# Patient Record
Sex: FEMALE | Race: BLACK OR AFRICAN AMERICAN | NOT HISPANIC OR LATINO | Employment: PART TIME | URBAN - METROPOLITAN AREA
[De-identification: names, ages, dates, MRNs, and addresses within clinical notes are randomized per-mention and may not be internally consistent; named-entity substitution may affect disease eponyms.]

---

## 2017-09-14 ENCOUNTER — TELEPHONE (OUTPATIENT)
Dept: SURGERY | Facility: CLINIC | Age: 52
End: 2017-09-14

## 2017-09-14 NOTE — TELEPHONE ENCOUNTER
"Leidy Lindo MA; [facility]   Caller: Unspecified (Today,  8:42 AM)             Patient came in for Genetic testing with Summer GARG for 7:30.  The patient demographic shows no healthcare insurance coverage.  I spoke to Summer she stated that the patient appointment will take between 45 min to an hour for the Genetic Consultation.  Once the test is performed, then sample will be sent to the Outside Lab and then they will reach out to the patient in regards to their patient responsibility.  However if the patient cannot afford then the Lab will offer payment option or Financial Assistance.   I then spoke to the patient in regards to the cost and the financial obligation.  Per the patient, she does not have insurance and cannot afford the $500, self-pay deposit.  In addition, she does not want to give her personal financial information to apply for Financial Assistance.  Per the patient, she stated and I quote:   "To be honest I am only taking the test to appease her Aunt and I thought that my Aunt insurance would have paid for her to take the test."   I informed the patient that [facility] would not file the claim to her Aunt insurance carrier for the Genetic Test.     The patient understood and apologies for any inconvenient that she may or may not cause.  I stressed to the patient no apologies was necessary and the ending of the conversation went very well.  The patient requested to cancel the appointment.        "

## 2018-03-15 PROBLEM — Z12.11 COLON CANCER SCREENING: Status: ACTIVE | Noted: 2018-03-15

## 2018-12-11 PROBLEM — Z12.11 COLON CANCER SCREENING: Status: RESOLVED | Noted: 2018-03-15 | Resolved: 2018-12-11

## 2018-12-11 PROBLEM — I10 ESSENTIAL HYPERTENSION: Chronic | Status: ACTIVE | Noted: 2018-12-11

## 2018-12-11 PROBLEM — E55.9 VITAMIN D DEFICIENCY: Chronic | Status: ACTIVE | Noted: 2018-12-11

## 2018-12-11 PROBLEM — Z98.890 S/P COLONOSCOPY WITH POLYPECTOMY: Chronic | Status: ACTIVE | Noted: 2018-12-11

## 2018-12-13 DIAGNOSIS — R10.30 LOWER ABDOMINAL PAIN: Primary | ICD-10-CM

## 2020-01-09 ENCOUNTER — CLINICAL SUPPORT (OUTPATIENT)
Dept: DIABETES | Facility: CLINIC | Age: 55
End: 2020-01-09
Payer: MEDICAID

## 2020-01-09 VITALS — BODY MASS INDEX: 39.73 KG/M2 | HEIGHT: 60 IN | WEIGHT: 202.38 LBS

## 2020-01-09 DIAGNOSIS — I10 ESSENTIAL HYPERTENSION: ICD-10-CM

## 2020-01-09 DIAGNOSIS — E66.9 OBESITY: ICD-10-CM

## 2020-01-09 DIAGNOSIS — K57.90 DIVERTICULOSIS: Primary | ICD-10-CM

## 2020-01-09 DIAGNOSIS — E55.9 VITAMIN D DEFICIENCY: ICD-10-CM

## 2020-01-09 PROCEDURE — 99999 PR PBB SHADOW E&M-EST. PATIENT-LVL III: ICD-10-PCS | Mod: PBBFAC,,, | Performed by: DIETITIAN, REGISTERED

## 2020-01-09 PROCEDURE — 99213 OFFICE O/P EST LOW 20 MIN: CPT | Mod: PBBFAC,PN | Performed by: DIETITIAN, REGISTERED

## 2020-01-09 PROCEDURE — 99999 PR PBB SHADOW E&M-EST. PATIENT-LVL III: CPT | Mod: PBBFAC,,, | Performed by: DIETITIAN, REGISTERED

## 2020-01-09 PROCEDURE — 97802 MEDICAL NUTRITION INDIV IN: CPT | Mod: PBBFAC,PN,59 | Performed by: DIETITIAN, REGISTERED

## 2020-01-09 NOTE — PROGRESS NOTES
Nutrition Education  Author: Kandice Doe RD, CDE  Date: 1/9/2020              Health Maintenance was reviewed today with patient. Discussed with patient importance of routine eye exams, foot exams/foot care, blood work (i.e.: A1c, microalbumin, and lipid), dental visits, yearly flu vaccine, and pneumonia vaccine as indicated by PCP. Patient verbalized understanding.     Health Maintenance Topics with due status: Not Due       Topic Last Completion Date    Colonoscopy 03/15/2018    Lipid Panel 12/12/2018     Health Maintenance Due   Topic Date Due    Hepatitis C Screening  1965    TETANUS VACCINE  08/10/1983       Nutrition  Meal Planning: skipping meals, water  What type of sweetener do you use?: none  What type of beverages do you drink?: milk, water         Exercise   Exercise Type: none  Frequency: Never         Social History  Preferred Learning Method: Face to Face  Primary Support: Self, Family  Educational Level: College Graduate  Occupation: works at [employer]          .Nutrition Problem  Altered GI Function    Related to (etiology):   Diverticulosis    Signs and Symptoms (as evidenced by):   New diagnosis Diverticulosis     Interventions/Recommendations (treatment strategy):  Nutrition education to build basic and essential nutrition related knowledge of diverticulosis and obesity/weight loss    Nutrition Diagnosis Status:   New                        Barriers to Change  Barriers to Change: None  Learning Challenges : None    Readiness to Learn   Readiness to Learn : Acceptance    Cultural Influences  Cultural Influences: No    Diabetes Education Assessment/Progress  Diabetes Disease Process (diabetes disease process and treatment options): Not Applicable  Nutrition (Incorporating nutritional management into one's lifestyle): Comprehends Key Points, Discussion, Instructed, Individual Session, Written Materials Provided, Demonstration  Physical Activity (incorporating physical activity into one's  lifestyle): Comprehends Key Points, Discussion, Instructed, Individual Session, Written Materials Provided  Medications (states correct name, dose, onset, peak, duration, side effects & timing of meds): Not Applicable  Monitoring (monitoring blood glucose/other parameters & using results): Not Applicable  Acute Complications (preventing, detecting, and treating acute complications): Not Applicable  Chronic Complications (preventing, detecting, and treating chronic complications): Not Applicable  Clinical (diabetes, other pertinent medical history, and relevant comorbidities reviewed during visit): Not Applicable  Cognitive (knowledge of self-management skills, functional health literacy): Not Applicable  Psychosocial (emotional response to diabetes): Not Applicable  Diabetes Distress and Support Systems: Not Applicable  Behavioral (readiness for change, lifestyle practices, self-care behaviors): Not Applicable    Goals  Patient has selected/evaluated goals during today's session: Yes, selected  Healthy Eating: Set  Start Date: 01/09/20  Target Date: 07/09/20         Diabetes Care Plan/Intervention  Education Plan/Intervention: Individual Follow-Up DSMT    Diabetes Meal Plan  Restrictions: Low Fat, Low Sodium, Restricted Carbohydrate  Calories: 1200  Carbohydrate Per Meal: 20-30g  Carbohydrate Per Snack : 7-15g  Fat: 35  Protein: 90    Today's Self-Management Care Plan was developed with the patient's input and is based on barriers identified during today's assessment.    The long and short-term goals in the care plan were written with the patient/caregiver's input. The patient has agreed to work toward these goals to improve her overall diabetes control.      The patient received a copy of today's self-management plan and verbalized understanding of the care plan, goals, and all of today's instructions.      The patient was encouraged to communicate with her physician and care team regarding her condition(s) and  treatment.  I provided the patient with my contact information today and encouraged her to contact me via phone or patient portal as needed.     Education Units of Time   Time Spent: 45 min

## 2021-04-16 ENCOUNTER — PATIENT MESSAGE (OUTPATIENT)
Dept: RESEARCH | Facility: HOSPITAL | Age: 56
End: 2021-04-16

## 2021-06-14 ENCOUNTER — OFFICE VISIT (OUTPATIENT)
Dept: OBSTETRICS AND GYNECOLOGY | Facility: CLINIC | Age: 56
End: 2021-06-14
Payer: MEDICAID

## 2021-06-14 VITALS — WEIGHT: 199.5 LBS | BODY MASS INDEX: 38.97 KG/M2 | DIASTOLIC BLOOD PRESSURE: 90 MMHG | SYSTOLIC BLOOD PRESSURE: 132 MMHG

## 2021-06-14 DIAGNOSIS — Z80.3 FAMILY HISTORY OF BREAST CANCER IN FIRST DEGREE RELATIVE: ICD-10-CM

## 2021-06-14 DIAGNOSIS — Z01.419 WELL WOMAN EXAM WITH ROUTINE GYNECOLOGICAL EXAM: Primary | ICD-10-CM

## 2021-06-14 DIAGNOSIS — Z85.3 PERSONAL HISTORY OF BREAST CANCER: ICD-10-CM

## 2021-06-14 DIAGNOSIS — R23.2 HOT FLASHES: ICD-10-CM

## 2021-06-14 PROCEDURE — 99999 PR PBB SHADOW E&M-EST. PATIENT-LVL III: CPT | Mod: PBBFAC,,, | Performed by: STUDENT IN AN ORGANIZED HEALTH CARE EDUCATION/TRAINING PROGRAM

## 2021-06-14 PROCEDURE — 99386 PR PREVENTIVE VISIT,NEW,40-64: ICD-10-PCS | Mod: S$PBB,,, | Performed by: STUDENT IN AN ORGANIZED HEALTH CARE EDUCATION/TRAINING PROGRAM

## 2021-06-14 PROCEDURE — 88175 CYTOPATH C/V AUTO FLUID REDO: CPT | Performed by: STUDENT IN AN ORGANIZED HEALTH CARE EDUCATION/TRAINING PROGRAM

## 2021-06-14 PROCEDURE — 99999 PR PBB SHADOW E&M-EST. PATIENT-LVL III: ICD-10-PCS | Mod: PBBFAC,,, | Performed by: STUDENT IN AN ORGANIZED HEALTH CARE EDUCATION/TRAINING PROGRAM

## 2021-06-14 PROCEDURE — 87624 HPV HI-RISK TYP POOLED RSLT: CPT | Performed by: STUDENT IN AN ORGANIZED HEALTH CARE EDUCATION/TRAINING PROGRAM

## 2021-06-14 PROCEDURE — 99386 PREV VISIT NEW AGE 40-64: CPT | Mod: S$PBB,,, | Performed by: STUDENT IN AN ORGANIZED HEALTH CARE EDUCATION/TRAINING PROGRAM

## 2021-06-14 PROCEDURE — 99213 OFFICE O/P EST LOW 20 MIN: CPT | Mod: PBBFAC,PN | Performed by: STUDENT IN AN ORGANIZED HEALTH CARE EDUCATION/TRAINING PROGRAM

## 2021-06-14 RX ORDER — METFORMIN HYDROCHLORIDE 750 MG/1
TABLET, EXTENDED RELEASE ORAL
COMMUNITY

## 2021-06-14 RX ORDER — AMLODIPINE BESYLATE 10 MG/1
TABLET ORAL
COMMUNITY
Start: 2021-04-26

## 2021-06-18 LAB
FINAL PATHOLOGIC DIAGNOSIS: NORMAL
HPV HR 12 DNA SPEC QL NAA+PROBE: NEGATIVE
HPV16 AG SPEC QL: NEGATIVE
HPV18 DNA SPEC QL NAA+PROBE: NEGATIVE
Lab: NORMAL

## 2022-08-01 ENCOUNTER — TELEPHONE (OUTPATIENT)
Dept: SURGERY | Facility: CLINIC | Age: 57
End: 2022-08-01
Payer: MEDICAID

## 2022-08-01 DIAGNOSIS — Z12.11 SCREENING FOR COLON CANCER: Primary | ICD-10-CM

## 2022-08-01 RX ORDER — SODIUM CHLORIDE 0.9 % (FLUSH) 0.9 %
3 SYRINGE (ML) INJECTION
Status: CANCELLED | OUTPATIENT
Start: 2022-08-01

## 2022-08-01 NOTE — TELEPHONE ENCOUNTER
Called patient in reference to a referral to Colorectal Surgery for colon cancer screening. Patient verbally consented to a Colonoscopy and request 10/06/2022Patient was advised a designated  is required on the day of the Colonoscopy to drive the patient home and the  must be at least. 18 years old.Colonoscopy Prep instructions were thoroughly explained and discussed with the patient.   It was emphasized, and reiterated to the patient, not to follow the prep instructions that comes with the Prep Kit. However, to please follow the prep instructions that will be received in the mail from the [facility]   Patient acknowledges understanding Prep instructions as explained and discussed on the phone.. Patient was advised the Colonoscopy Prep instructions discussed and explained on the phone,are being mailed out to the patient's verified address on file. Patient's address on file was verified with the patient for accuracy of mailing. Patient's medications on file was reviewed with the patient for accuracy of information. Patient denies taking  any other medications other than those listed and verified on medication profile.Patient was explained the Colonoscopy will be performed here at [facility]. Patient was further explained the Pre-Op will call one day prior to the procedure date, to discuss Pre-Op instructions;and what time to report for the Colonoscopy. The patient was given the opportunity to ask any questions about the Colonoscopy.       Bowel Prep/SUPREP instructions                                               [facility]    [address] [place] [address]      You are scheduled for a Colonoscopy with _______________________ on ____________________   At [facility] in [place].    Check in at the hospital on 1st floor Registration area next to Emergency room.    Please call [phone] to reschedule or if you have any questions.    An  adult friend/family member must come with you to drive you home.  You cannot drive, take a taxi, Uber/Lyft or bus to leave the Hospital alone. If you do not have someone with you to drive you home, your test will be cancelled.       Please follow the directions of your doctor if you take any pills that thin your blood.  If you take these meds: Aggrenox, Brilinta, Effient, Eliquis, Lovenox, Plavix, Pletal Pradaxa ticilid, Xarelto, or Coumadin, let the doctor's office know.    Don't: On the morning of the test do not take insulin or pills for diabetes.   Do: On the morning of the test, do take any pills for blood pressure, heart, anti-rejection and or seizures with a small sip of water. Bring any inhalers with you day of procedure.    To have a good prep, you must follow these instructions- please do not use the directions from the pharmacy!      The doctor will send a prescription for the SUPREP   The day Before the test:   You can only drink CLEAR LIQUIDS the whole day before your test. You can't eat any food for the whole day.    You CAN have:   Water,Coffee or decaf coffee (no milk or cream)    Tea   Soft drinks- regular and sugar free   Jell-O (green or yellow)   Apple Juice, grape juice, white cranberry juice   Gatorade, Power Aid, Crystal Light, Fabian Aid   Lemonade and Limeade   Bouillon, clear soup   Snowball, popsicles   YOU CAN'T DRINK ANYTHING RED   YOU CAN'T DRINK ALCOHOL   ONLY DRINK WHAT IS ON THIS List      At 5pm the night before your test:   Pour the 1st bottle of SUPREP into the cup provided in the box.  Add water to the line on the cup and mix well. Drink the whole cup and then drink 2 more full cups of water over the 1 hour.    This can be easier to drink if it is cold.  You can mix it 20 minutes ahead of time and place in the refrigerator before you drink it.  You must drink it within 30-45 minutes of mixing it. Do NOT pour the drink over ice. You can drink it with a straw.    The Day of the test-  We will call you 1 day before your test to tell you what time to get there.    5 hours before you come to the hospital (this may be the middle of the night)   Pour the 2nd bottle of SUPREP into to the cup provided in the box. Add water to the line on the cup and mix well. Drink the whole cup and then drink 2 more full cups of water over 1 hour.    It might be easier to drink if it is cold. You can mix it 20 minutes ahead of time and place in the refrigerator before you drink it. You must drink it within 30-45 minutes of mixing it. Do NOT pour the drink over ice. You can drink it with a straw.   YOU CAN'T EAT OR DRINK ANYTHING ELSE ONCE YOU FINISH THE PREP.   Leave all valuables and jewelry at home. You will be at the hospital for 2-4 hours.    Call the Endoscopy Scheduling Department at [phone] with any questions about your procedure.    Please  your medication from your local pharmacy. If you are unable to  the SUPREP Kit please contact our office.   Thank you   Endo Scheduling Dept   [facility]

## 2022-08-02 RX ORDER — SODIUM, POTASSIUM,MAG SULFATES 17.5-3.13G
1 SOLUTION, RECONSTITUTED, ORAL ORAL DAILY
Qty: 1 KIT | Refills: 0 | Status: SHIPPED | OUTPATIENT
Start: 2022-08-02 | End: 2022-08-04

## 2022-11-01 ENCOUNTER — TELEPHONE (OUTPATIENT)
Dept: GASTROENTEROLOGY | Facility: CLINIC | Age: 57
End: 2022-11-01
Payer: MEDICAID

## 2022-11-01 NOTE — TELEPHONE ENCOUNTER
----- Message from Juan Pablo Hinson MD sent at 10/12/2022  8:19 AM CDT -----  Pathology from recent colonoscopy reviewed.  Colon polyp(s) benign.  Repeat colonoscopy in 5 years.

## 2024-06-14 ENCOUNTER — TELEPHONE (OUTPATIENT)
Dept: ORTHOPEDICS | Facility: CLINIC | Age: 59
End: 2024-06-14

## 2024-06-14 NOTE — TELEPHONE ENCOUNTER
Spoke with pt, unable to schedule because Medicaid is primary insurance. Pt states that she no longer has Medicaid. I will call pt back later today.   ----- Message from Vale Kasper sent at 6/14/2024  8:16 AM CDT -----  Hello,    I have pt being referred for pain of left shoulder.  I have scanned the referral /records in to media mgr within Epic. Please review and contact for scheduling,thanks!           Vale MERIDA   [facility]

## 2024-06-14 NOTE — TELEPHONE ENCOUNTER
Called pt to inform her that she will need to call the financial department to have the Medicaid removed from her chart.

## 2024-07-17 ENCOUNTER — OFFICE VISIT (OUTPATIENT)
Dept: ORTHOPEDICS | Facility: CLINIC | Age: 59
End: 2024-07-17
Payer: COMMERCIAL

## 2024-07-17 VITALS
HEART RATE: 84 BPM | BODY MASS INDEX: 35.07 KG/M2 | DIASTOLIC BLOOD PRESSURE: 73 MMHG | SYSTOLIC BLOOD PRESSURE: 114 MMHG | WEIGHT: 179.56 LBS

## 2024-07-17 DIAGNOSIS — M25.512 LEFT SHOULDER PAIN, UNSPECIFIED CHRONICITY: ICD-10-CM

## 2024-07-17 DIAGNOSIS — M19.012 ARTHROSIS OF LEFT ACROMIOCLAVICULAR JOINT: ICD-10-CM

## 2024-07-17 DIAGNOSIS — M25.512 ACUTE PAIN OF LEFT SHOULDER: Primary | ICD-10-CM

## 2024-07-17 PROCEDURE — 3078F DIAST BP <80 MM HG: CPT | Mod: CPTII,S$GLB,, | Performed by: STUDENT IN AN ORGANIZED HEALTH CARE EDUCATION/TRAINING PROGRAM

## 2024-07-17 PROCEDURE — 1159F MED LIST DOCD IN RCRD: CPT | Mod: CPTII,S$GLB,, | Performed by: STUDENT IN AN ORGANIZED HEALTH CARE EDUCATION/TRAINING PROGRAM

## 2024-07-17 PROCEDURE — 3008F BODY MASS INDEX DOCD: CPT | Mod: CPTII,S$GLB,, | Performed by: STUDENT IN AN ORGANIZED HEALTH CARE EDUCATION/TRAINING PROGRAM

## 2024-07-17 PROCEDURE — 1125F AMNT PAIN NOTED PAIN PRSNT: CPT | Mod: CPTII,S$GLB,, | Performed by: STUDENT IN AN ORGANIZED HEALTH CARE EDUCATION/TRAINING PROGRAM

## 2024-07-17 PROCEDURE — 99204 OFFICE O/P NEW MOD 45 MIN: CPT | Mod: S$GLB,,, | Performed by: STUDENT IN AN ORGANIZED HEALTH CARE EDUCATION/TRAINING PROGRAM

## 2024-07-17 PROCEDURE — 1160F RVW MEDS BY RX/DR IN RCRD: CPT | Mod: CPTII,S$GLB,, | Performed by: STUDENT IN AN ORGANIZED HEALTH CARE EDUCATION/TRAINING PROGRAM

## 2024-07-17 PROCEDURE — 99999 PR PBB SHADOW E&M-EST. PATIENT-LVL III: CPT | Mod: PBBFAC,,, | Performed by: STUDENT IN AN ORGANIZED HEALTH CARE EDUCATION/TRAINING PROGRAM

## 2024-07-17 PROCEDURE — 3074F SYST BP LT 130 MM HG: CPT | Mod: CPTII,S$GLB,, | Performed by: STUDENT IN AN ORGANIZED HEALTH CARE EDUCATION/TRAINING PROGRAM

## 2024-07-17 NOTE — PROGRESS NOTES
"CC: left shoulder pain    58 y.o. Female presents today for evaluation of her left shoulder pain. 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 Pt reports feeling a "little pop" during initial injury. Pt reports she has had shoulder pain since this injury. Pt localizes pain to left lateral neck, superior shoulder, lateral upper arm, and anterior forearm (stops at left wrist). Pt reports pain is 9/10 today, and states pain is usually at a 7/10 most day. Pt denies mechanical symptoms. Pt denies numbness/tingling. Pt denies prev hx of injuries to cervical spine.     Hand dominance: Right     SYMPTOMS:   Pain Score: 9/10   Pain location:  left lateral neck, superior shoulder, lateral upper arm, and anterior forearm (stops at left wrist)  Time of onset: 10/5/23  Trauma, injury: reached out to catch a child, felt "little pop"    Nocturnal pain: yes  Weakness: no  Clicking, catching: "little pop" during initial injury  Neck problems: left lateral neck pain     INTERVENTIONS:   Medications tried: lidocaine rub, biofreeze, tylenol rub, meloxicam (no relief), tylenol PRN ("makes me sleep")  Physical therapy: none  Injections: none into shoulder; prev received IM injection in hip for shoulder pain in May '24 (some relief)    RELEVANT HISTORY:   Imaging to date: 12/18/23  Previous significant shoulder/arm injuries: none  Previous shoulder surgeries: none     Occupation: Teacher Aid / Special Needs Education     REVIEW OF SYSTEMS:   Constitution: Patient denies fever or chills.  Eyes: Patient denies eye pain or vision changes.  HEENT: Patient denies ear pain, sore throat, or nasal discharge.  CVS: Patient denies chest pain.  Lungs: Patient denies shortness of breath or cough.  Abdomen: Patient denies any stomach pain, nausea, vomiting, or diarrhea  Skin: Patient denies skin rash or itching.    Musculoskeletal: Patient " reports right knee/lower leg pain.   Neuro: Patient denies any numbness or tingling in upper or lower extremities.  Psych: Patient denies any current anxiety or nervousness.    PAST MEDICAL HISTORY:   Past Medical History:   Diagnosis Date    Breast cancer     Breast bilateral. Had double masectomy    Hypertension        PAST SURGICAL HISTORY:  Past Surgical History:   Procedure Laterality Date    BILATERAL OOPHORECTOMY      BREAST RECONSTRUCTION Bilateral     COLONOSCOPY  2006    COLONOSCOPY N/A 3/15/2018    Procedure: COLONOSCOPY;  Surgeon: Ruben Hernadez MD;  Location: [facility] ENDO;  Service: General;  Laterality: N/A;    COLONOSCOPY N/A 10/6/2022    Procedure: COLONOSCOPY;  Surgeon: Juan Pablo Hinson MD;  Location: [facility] ENDO;  Service: Endoscopy;  Laterality: N/A;  with polypectomy    COLONOSCOPY W/ BIOPSIES AND POLYPECTOMY  03/15/2018    HYSTERECTOMY N/A     KNEE ARTHROSCOPY Right 1993    MASTECTOMY      bilat    MASTECTOMY Bilateral        FAMILY HISTORY:  Family History   Problem Relation Name Age of Onset    Breast cancer Mother      Cancer Mother          breast ca     Peripheral vascular disease Father      Ovarian cancer Neg Hx      Colon cancer Neg Hx         SOCIAL HISTORY:  Social History     Socioeconomic History    Marital status:    Occupational History    Occupation:    Tobacco Use    Smoking status: Never    Smokeless tobacco: Never   Substance and Sexual Activity    Alcohol use: No    Drug use: No    Sexual activity: Not Currently     Partners: Male     Birth control/protection: None       MEDICATIONS:     Current Outpatient Medications:     amLODIPine (NORVASC) 10 MG tablet, amlodipine 10 mg tablet  TAKE ONE TABLET BY MOUTH EVERY DAY, Disp: , Rfl:     ergocalciferol (VITAMIN D2) 50,000 unit Cap, Take 50,000 Units by mouth every 7 days., Disp: , Rfl:     metFORMIN (GLUCOPHAGE-XR) 750 MG ER 24hr tablet, metformin  mg tablet,extended release 24 hr   TAKE ONE TABLET BY MOUTH EVERY DAY, Disp: , Rfl:     tirzepatide 7.5 mg/0.5 mL PnIj, Inject 7.5 mg into the skin every 7 days., Disp: , Rfl:     gabapentin (NEURONTIN) 300 MG capsule, Take 1 capsule (300 mg total) by mouth 3 (three) times daily. (Patient not taking: Reported on 9/28/2022), Disp: 90 capsule, Rfl: 11    HYDROcodone-acetaminophen (NORCO) 5-325 mg per tablet, Take 1 tablet by mouth every 4 (four) hours as needed. (Patient not taking: Reported on 6/14/2021), Disp: 10 tablet, Rfl: 0    lactulose (CEPHULAC) 20 gram Pack, Take 1 packet (20 g total) by mouth 2 (two) times daily. (Patient not taking: Reported on 6/14/2021), Disp: 60 packet, Rfl: 2  No current facility-administered medications for this visit.    Facility-Administered Medications Ordered in Other Visits:     0.9%  NaCl infusion, , Intravenous, Continuous, Juan Pablo Hinson MD    LIDOcaine (PF) 10 mg/ml (1%) injection 10 mg, 1 mL, Intradermal, Once PRN, Juan Pablo Hinson MD    ALLERGIES:   Review of patient's allergies indicates:   Allergen Reactions    Latex, natural rubber Rash    Penicillins Rash and Itching    Latex Itching        PHYSICAL EXAMINATION:  /73   Pulse 84   Wt 81.4 kg (179 lb 9 oz)   BMI 35.07 kg/m²   Vitals signs and nursing note have been reviewed.    General: In no acute distress, well developed, well nourished, no diaphoresis  Eyes: EOM full and smooth, no eye redness or discharge  HEENT: normocephalic and atraumatic, neck supple, trachea midline, no nasal discharge  Cardiovascular: no LE edema  Lungs: respirations non-labored, no conversational dyspnea   Neuro: AAOx3, CN2-12 grossly intact  Skin: No rashes, warm and dry  Psychiatric: cooperative, pleasant, mood and affect appropriate for age    Left Shoulder:  INSPECTION / PALPATION:  -Deformity   -Ecchymosis   -Atrophy   -Sulcus sign   -No TTP over anatomy including clavicle, scapular spine, ACJ, acromion, supraspinatus, infraspinatus, bicipital  groove     ROM:    Flex to 80° vs 170° contra   Abduct to 60° vs 150° contra   Int rot to abdomen vs T7 contra   Ext rot to 40° vs 60° contra  -Scapular dyskinesis     STRENGTH:   Scaption 3/5   Flexion 3/5   Ext rot 3/5   Int rot 3/5    OTHER:   +Painful arc   +Drop arm above 60°  +Mckinnon-Venkat   +Austin active compression   +Empty Can  +Full Can  +Speed's   -Apprehension    NECK:   Grossly FROM & painless in neck flex, ext, B/L torsion, B/L lat flexion   -Spurling B/L    Hands NVI B/L    IMAGIN. Shoulder X-ray ordered due to left shoulder pain. 3 views taken 2023.   2. X-ray images were reviewed personally by me and then directly with patient.  3. FINDINGS: Left glenohumeral articulation appears maintained. AC joint is intact with DJD. No acute fracture, dislocation or osseous destruction. Linear 7 mm mineralization focus adjacent to the humeral greater tuberosity as can be seen with sequela hydroxyapatite deposition.     4. IMPRESSION:  As above.    ASSESSMENT:      ICD-10-CM ICD-9-CM   1. Acute pain of left shoulder  M25.512 719.41   2. Arthrosis of left acromioclavicular joint  M19.012 715.91         PLAN:  Based on patient history, physical exam findings, and imaging my concern is that patient may have had a rotator cuff tear with her recent injury.  We will move forward with MRI for further evaluation.  Follow-up for MRI results virtually.    All questions were answered to the best of my ability and all concerns were addressed at this time.    Follow up for above, or sooner if needed.      This note is dictated using the M*Modal Fluency Direct word recognition program. There are word recognition mistakes that are occasionally missed on review.

## 2024-07-18 NOTE — PROGRESS NOTES
Telemedicine/Virtual Visit Documentation:     The patient location is: home    The chief complaint leading to consultation is: see HPI    VISIT TYPE X   Virtual visit with synchronous audio and video X   Telephone E/M service      Total time spent with patient: see X roula on chart below.   More than half of the time was spent counseling or coordinating care including prognosis, differential diagnosis, risks and benefits of treatment, instructions, compliance risk reductions     EST MINUTES X   85717 5    74081 10    88533 15    30048 25 X   99215 40    NEW     85353 10    12011 20    27355 30    98511 45    57690 60    PHONE      5-10    28653 11-20    02988 21-30      H&P  Orthopaedics      SUBJECTIVE:     History of Present Illness:  Patient is a 58 y.o. female with left shoulder pain following up to review MRI results.  Patient with persistent left shoulder pain.    Review of patient's allergies indicates:   Allergen Reactions    Latex, natural rubber Rash    Penicillins Rash and Itching    Latex Itching       Past Medical History:   Diagnosis Date    Breast cancer     Breast bilateral. Had double masectomy    Hypertension      Past Surgical History:   Procedure Laterality Date    BILATERAL OOPHORECTOMY      BREAST RECONSTRUCTION Bilateral     COLONOSCOPY  2006    COLONOSCOPY N/A 3/15/2018    Procedure: COLONOSCOPY;  Surgeon: Ruben Hernadez MD;  Location: [facility];  Service: General;  Laterality: N/A;    COLONOSCOPY N/A 10/6/2022    Procedure: COLONOSCOPY;  Surgeon: Juan Pablo Hinson MD;  Location: [facility];  Service: Endoscopy;  Laterality: N/A;  with polypectomy    COLONOSCOPY W/ BIOPSIES AND POLYPECTOMY  03/15/2018    HYSTERECTOMY N/A     KNEE ARTHROSCOPY Right 1993    MASTECTOMY      bilat    MASTECTOMY Bilateral      Family History   Problem Relation Name Age of Onset    Breast cancer Mother      Cancer Mother          breast ca     Peripheral vascular disease Father      Ovarian cancer  Neg Hx      Colon cancer Neg Hx       Social History     Tobacco Use    Smoking status: Never    Smokeless tobacco: Never   Substance Use Topics    Alcohol use: No    Drug use: No        Review of Systems:  Patient denies constitutional symptoms, cardiac symptoms, respiratory symptoms, GI symptoms.  The remainder of the musculoskeletal ROS is included in the HPI.      OBJECTIVE:     Physical Exam:  Physical exam limited as this was a virtual visit, but it is apparent that the individual is in no acute distress, well groomed, well kept.  Speech is normal.  Patient is alert and oriented x3.  Mood and affect appear normal.     IMAGIN. MRI ordered due to left shoulder pain, taken on 24.  2. MRI images were reviewed personally by me and then directly with patient.  3. FINDINGS: ROTATOR CUFF:     Supraspinatus: Intact.  Moderate tendinosis.     Infraspinatus: Intact.  Moderate tendinosis.     Subscapularis: Intact.  Moderate tendinosis.     Teres Minor: Intact.  No tendinosis.     There is minimal physiologic  fluid within the subacromial/subdeltoid bursa.     LABRUM: Diffuse labral fraying, particularly superiorly on this standard non arthrogram exam.  Anterior inferior labrum appears intact. IGHL:Intact.     LONG HEAD BICEPS TENDON: Located within bicipital groove and intact.Biceps-labral anchor demonstrates degenerative related change. Moderate intra-articular tendinosis.  No tenosynovitis. Rotator Interval is abnormal with synovial thickening/increased signal.. Biceps pulley is intact.     BONES: No evident fracture.Visualized marrow within normal limits. AC joint demonstrates normal alignment with moderate hypertrophy.Mild osteo-acromial outlet narrowing (with mass effect on rotator cuff myotendinous junction) due to hypertrophic changes at the acromioclavicular joint.  There is no evident os acromiale.     CARTILAGE: Humeral head and glenoid cartilage preserved without focal defects. No subchondral  marrow edema.  No synovial abnormality or intra-articular loose bodies. Glenoid fossa demonstrates no sclerosis.     MUSCLES:  Normal bulk and signal.    4. IMPRESSION: Moderate tendinosis of the rotator cuff with labral fraying predominantly superiorly. No evidence for full-thickness rotator cuff tear. Intra-articular biceps tendinosis with degenerative change at the level of the biceps anchor. Mild synovitis at the level of the rotator interval. Hypertrophic changes of the acromioclavicular joint with mass effect upon the myotendinous junction of rotator cuff.     ASSESSMENT/PLAN:     A/P: Paty Gaona is a 58 y.o. with acute left shoulder pain secondary to rotator cuff tendinosis.  MRI did not display any acute rotator cuff tear as we did show degeneration of the labrum as well as acromioclavicular joint arthrosis.  Patient will be prescribed a trial of NSAIDs.  Patient will also be sent to formal physical therapy.  We will follow up in 6-8 weeks for re-evaluation.  Pending improvement, may move for with [facility].

## 2024-07-29 ENCOUNTER — TELEPHONE (OUTPATIENT)
Dept: SPORTS MEDICINE | Facility: CLINIC | Age: 59
End: 2024-07-29

## 2024-07-29 ENCOUNTER — OFFICE VISIT (OUTPATIENT)
Dept: SPORTS MEDICINE | Facility: CLINIC | Age: 59
End: 2024-07-29
Payer: COMMERCIAL

## 2024-07-29 DIAGNOSIS — M24.112 LABRAL TEAR OF SHOULDER, DEGENERATIVE, LEFT: ICD-10-CM

## 2024-07-29 DIAGNOSIS — M67.814 TENDINOSIS OF LEFT ROTATOR CUFF: ICD-10-CM

## 2024-07-29 DIAGNOSIS — M19.012 ARTHROSIS OF LEFT ACROMIOCLAVICULAR JOINT: ICD-10-CM

## 2024-07-29 DIAGNOSIS — M25.512 ACUTE PAIN OF LEFT SHOULDER: Primary | ICD-10-CM

## 2024-07-29 PROCEDURE — 1159F MED LIST DOCD IN RCRD: CPT | Mod: CPTII,95,, | Performed by: STUDENT IN AN ORGANIZED HEALTH CARE EDUCATION/TRAINING PROGRAM

## 2024-07-29 PROCEDURE — 1160F RVW MEDS BY RX/DR IN RCRD: CPT | Mod: CPTII,95,, | Performed by: STUDENT IN AN ORGANIZED HEALTH CARE EDUCATION/TRAINING PROGRAM

## 2024-07-29 PROCEDURE — 99214 OFFICE O/P EST MOD 30 MIN: CPT | Mod: 95,,, | Performed by: STUDENT IN AN ORGANIZED HEALTH CARE EDUCATION/TRAINING PROGRAM

## 2024-07-29 RX ORDER — IBUPROFEN 800 MG/1
800 TABLET ORAL 3 TIMES DAILY
Qty: 90 TABLET | Refills: 1 | Status: SHIPPED | OUTPATIENT
Start: 2024-07-29

## 2024-07-29 NOTE — PATIENT INSTRUCTIONS
Take prescribed medication (NSAID) for 3 weeks.  After 3 weeks, stop medication.  If pain returns, you may continue taking medication until your follow-up. Stop medications 3 days before your follow-up visit.     Three rules of pain tolerance were explained to patient:  1.  Pain level should be distractible, should be able to participate without being focused on pain.  2.  All activities should be done with good form.  If you cannot have proper formal doing activity, activity is too advanced.  3.  If following activity pain level is increased, you may need to scale back your activity level.

## 2024-07-29 NOTE — TELEPHONE ENCOUNTER
Called and spoke to patient in regards to follow up with Dr. Levine.  Patient has been scheduled for 9/11/24 at the [facility].

## 2025-02-03 PROBLEM — E11.9 TYPE 2 DIABETES MELLITUS: Status: ACTIVE | Noted: 2023-04-11

## 2025-02-03 PROBLEM — G47.33 OBSTRUCTIVE SLEEP APNEA OF ADULT: Status: ACTIVE | Noted: 2019-12-10

## 2025-02-03 PROBLEM — Z86.0100 HISTORY OF COLONIC POLYPS: Status: ACTIVE | Noted: 2021-04-26

## 2025-02-03 PROBLEM — R73.03 PREDIABETES: Status: ACTIVE | Noted: 2021-03-18

## 2025-02-03 PROBLEM — T63.441A ALLERGIC REACTION TO BEE STING: Status: ACTIVE | Noted: 2025-02-03

## 2025-02-03 PROBLEM — E88.810 METABOLIC SYNDROME X: Status: ACTIVE | Noted: 2022-08-14

## 2025-02-03 PROBLEM — J10.1 INFLUENZA DUE TO INFLUENZA A VIRUS: Status: ACTIVE | Noted: 2020-02-07

## 2025-02-03 NOTE — PROGRESS NOTES
Allergy Clinic Note  [facility]    This note was created by combination of typed  and dictation. Transcription errors are likely.  If there are any questions, please contact me.    HISTORY      Patient ID: Paty Gaona is a 59 y.o. female.    Chief Complaint: Allergic Reaction, Allergies (food), and Food Intolerance      Referring Provider: Chastity Flores    History of Present Illness: Paty Gaona is a 59 y.o. female referred by Dr. Chastity Flores in the emergency department following visit 01/11/2025 for allergic reaction.     At initial visit, no skin lesions were present and she had no photographs.  She describes raised, red, itchy bumps in clusters and endorsed images of hives from the Internet.    Related medications and other interventions  S/p prednisone  EpiPen    02/05/2025:  At initial visit, client reported 2 episodes on the last 6 months of itching, hives, abdomnal pain, and diarrhea.  With the 2nd episode she also experienced generalized weakness and diaphoresis she took epi at home before presenting to the emergency room, where symptoms rapidly resolved.    #1 September 2024:  Client awoke about 7:00 a.m. with diffuse itching, abdominal pain, and diarrhea.  She noticed her hands were bright red and then developed diffuse welts.   She describes red raised itchy bumps on various sizes and shapes. She also developed laryngitis but denies any other throat symptoms.  She specifically denied any difficulty swallowing or shortness of breath. She also noted teeth chattering.    #2 December 2024:  again client awoke with itching, abdominal pain diarrhea, red palms followed by generalized hives.  On this occasion she also felt weak and said she was drenched in sweat.  She said she fell trying to get off off the toilet.  She took epi and presented to the emergency with.    For both occasions client denies associated shortness of breath, wheezing, chest pain, cough, throat  "constriction, difficulty swallowing, vomiting, fever for upper respiratory infection.  Following her 1st episode, she was seen by [facility] allergist Dr. Espinoza and had allergy skin testing that was evidently positive for multiple food and airborne allergens.  Client has been avoiding shellfish since the 1st episode because she had eaten shrimp salad more than 8 hours prior.    Since onset of most recent episode urticaria, client...  Denies fever, shakes, or chills  Denies loss of weight, appetite, or energy level  Denies change in the texture of her hair, skin, or nails  Denies change in the appearance of urine or stool  Denies vomiting, diarrhea, constipation, or abdominal pain  Denies abnormal bleeding  Denies any new aches or pains, specifically myalgias or arthralgia,   Denies any unusual moles  States cancer screening up-to-date       Medical history      Significant past medical history: HTN, history breast cancer, history colon polyps, [pre]diabetes, sleep apnea  Active problem list reviewed  Smoking Hx:  Client  reports that she has never smoked. She has never used smokeless tobacco.    Meds: MAR reviewed    Asthma:  No  Eczema: No  Rhinitis: No  Drug allergy/intolerance:  PCN -->"rash, itching client says this occurred over 30 years ago and she does not recall the details.  Venom allergy:  No  Latex allergy:  Yes.  Reports blowing nut multiple latex balloons was associated with itchy and pink eyes.  No history anaphylaxis.    Patient Active Problem List   Diagnosis    S/P colonoscopy with polypectomy    Vitamin D deficiency    Essential hypertension    Allergic reaction to bee sting    History of colonic polyps    Influenza due to influenza A virus    Metabolic syndrome X    Obstructive sleep apnea of adult    Prediabetes    Type 2 diabetes mellitus     Medication List with Changes/Refills   New Medications    EPINEPHRINE (EPIPEN 2-BAUDILIO) 0.3 MG/0.3 ML ATIN    Inject 0.3 mLs (0.3 mg total) into the " muscle once. for 1 dose       Start Date: 2/5/2025  End Date: 2/5/2025   Current Medications    AMLODIPINE (NORVASC) 10 MG TABLET    amlodipine 10 mg tablet   TAKE ONE TABLET BY MOUTH EVERY DAY       Start Date: 4/26/2021 End Date: --    AZELASTINE (ASTELIN) 137 MCG (0.1 %) NASAL SPRAY    2 sprays by Nasal route 2 (two) times daily as needed.       Start Date: 1/7/2025  End Date: --    CETIRIZINE (ZYRTEC) 10 MG TABLET    Take 10 mg by mouth daily as needed.       Start Date: 1/7/2025  End Date: --    CYCLOBENZAPRINE (FLEXERIL) 10 MG TABLET    Take 10 mg by mouth 3 (three) times daily as needed.       Start Date: 1/8/2025  End Date: --    EPINEPHRINE (EPIPEN) 0.3 MG/0.3 ML ATIN    Inject 0.6 mLs (0.6 mg total) into the muscle as needed (allergic reaction/anaphylaxis).       Start Date: 1/11/2025 End Date: 1/11/2026    ERGOCALCIFEROL (VITAMIN D2) 50,000 UNIT CAP    Take 50,000 Units by mouth every 7 days.       Start Date: --        End Date: --    FAMOTIDINE (PEPCID) 20 MG TABLET    Take 1 tablet (20 mg total) by mouth 2 (two) times daily.       Start Date: 9/1/2024  End Date: 9/1/2025    FAMOTIDINE (PEPCID) 20 MG TABLET    Take 1 tablet (20 mg total) by mouth 2 (two) times daily. for 5 days       Start Date: 1/11/2025 End Date: 1/16/2025    FLUTICASONE PROPIONATE (FLONASE) 50 MCG/ACTUATION NASAL SPRAY    1 spray by Each Nostril route once daily.       Start Date: 1/7/2025  End Date: --    HYDROCHLOROTHIAZIDE 12.5 MG TAB    Take 12.5 mg by mouth once daily.       Start Date: 7/29/2024 End Date: --    IBUPROFEN (ADVIL,MOTRIN) 800 MG TABLET    Take 1 tablet (800 mg total) by mouth 3 (three) times daily.       Start Date: 7/29/2024 End Date: --    IPRATROPIUM (ATROVENT) 42 MCG (0.06 %) NASAL SPRAY    1 spray by Each Nostril route 2 (two) times daily as needed.       Start Date: --        End Date: --    KETOROLAC (TORADOL) 10 MG TABLET    Take 10 mg by mouth every 6 (six) hours as needed.       Start Date: 7/29/2024 End  Date: --    MOUNJARO 10 MG/0.5 ML PNIJ    Inject 10 mg into the skin once daily.       Start Date: 7/29/2024 End Date: --    NITROGLYCERIN (NITROSTAT) 0.4 MG SL TABLET    Place 0.4 mg under the tongue every 5 (five) minutes as needed.       Start Date: --        End Date: --    SEMAGLUTIDE (OZEMPIC) 2 MG/DOSE (8 MG/3 ML) PNIJ    Inject 2 mg into the skin every 7 days.       Start Date: 6/28/2024 End Date: --    TIRZEPATIDE 7.5 MG/0.5 ML PNIJ    Inject 7.5 mg into the skin every 7 days.       Start Date: --        End Date: --   Discontinued Medications    DIPHENHYDRAMINE (BENADRYL) 25 MG CAPSULE    Take 1 capsule (25 mg total) by mouth every 6 (six) hours as needed for Itching or Allergies.       Start Date: 1/11/2025 End Date: 2/5/2025    METFORMIN (GLUCOPHAGE-XR) 750 MG ER 24HR TABLET    metformin  mg tablet,extended release 24 hr   TAKE ONE TABLET BY MOUTH EVERY DAY       Start Date: --        End Date: 2/5/2025    PREDNISONE (DELTASONE) 20 MG TABLET    Take 2 tablets (40 mg total) by mouth once daily. for 5 days       Start Date: 1/11/2025 End Date: 2/5/2025         Review of systems      CONST: no F/C/NS, no unintentional weight changes  NEURO:  no tremor, no weakness  EYES: no discharge, no erythema  EARS: no hearing loss, no sensation of fullness  PULM:  no SOB, no wheezing, no cough  CV: no CP, no palpitations  DERM: no rashes, no skin breaks    PHYSICAL EXAM     /69 (BP Location: Right arm, Patient Position: Sitting) Comment (BP Location): bilateral  Pulse 83   Ht 5' (1.524 m)   Wt 84.8 kg (186 lb 15.2 oz)   SpO2 97%   BMI 36.51 kg/m²   GEN: Awake and alert, no distress  DERM: No flushing, No rashes  EYE:  No ocular discharge  HENT: No nasal discharge, no hoarseness  PULM: Normal work of breathing, no cough  NEURO:  No focal deficit, speech fluent and logical  PSYCH: appropriate affect, normal behavior    MEDICAL DECISION MAKING     Data reviewed:       (new entries in bold-face)       "Allergy Testing            Lab results        Labs (118/2025)   CBC notable for microcytic indices without anemia.    EO count 100   CMP remarkable for albumin 3.3 with protein 6.7  Hemoglobin A1c 5.8   TSH and T4 normal   low vitamin-D 16        Imaging and other diagnostics      Neck xR (9/1/2024): "Exam quality is limited by overlapping of the patient's shoulders on the lateral view. Neck soft tissues not well evaluated. No obvious abnormality of the epiglottis, though this is not well evaluated. No significant prevertebral soft tissue edema. There is nonspecific neck soft tissue edema. Evaluation for localized swelling or mass/lump is limited with plain radiography. Further evaluation/follow-up as warranted clinically.       Medical records review   At initial visit reviewed ED note of 1/11/2025.  Client reported awakening at 7am with abdominal pian, nausea, tingling and redness in her hands.  Nursing note also indicates diarrhea.  IN ED (after admin EpiPen PTA) with elevated BP, sat 96%.  Exam normal.  Discharged on steroids and with an EpiPen.    At initial visit also reviewed ED note of 9/1/2024.  Client compalined of difficulty breathing and SOB upon awakening.  In ED pulse 99, BP elevated, sat 97%.  Px s/f "minimal oropharyngeal swelling" and remainder of exam normal. xR of neck was technically limited.  Treated with IVF, epi, IV steroids, IV H1 and H2 blockers.  Sx improved during ED course.  Discharge on steroids, Benadryl, Pepcid.       Diagnoses:     Paty Gaona is a 59 y.o. female. with  1. Allergic reaction    2. Urticaria    3. Vitamin D insufficiency    4. Low serum albumin    5. History of penicillin allergy    6. Abdominal pain, unspecified abdominal location          Assessment / Plan   Ms. Gaona has had 2 episodes of hives in association with abdominal pain and diarrhea.  The 2nd episode was also associated with generalized weakness, which may or may not have been due to " hypotension.  We will presume these symptoms are due to anaphylaxis unless proven otherwise.  Recommend checking baseline serum tryptase.  A tryptase level done during symptoms would be exceptionally helpful.  Plan to discuss this with client at next visit.  We will try to obtain recent skin test results from her outside allergist.  Therapeutically, she should continue to carry her EpiPen.  Follow up 2 weeks.    Client also has a remote, vague history of penicillin allergy.  Offered and accepted challenge.  Plan to schedule following next visit.    Health maintenance:  recent labs notable for microcytic indices without anemia, low albumin, and low vitamin-D.  Is not yet clear whether any of these are related to her presenting symptoms.      Allergic reaction x2 with urticaria, abdominal pain, diarrhea.  Cannot r/o hypotension x1  -     Tryptase; Future; Expected date: 02/12/2025  -     Ambulatory referral/consult to Allergy  -     EPINEPHrine (EPIPEN 2-BAUDILIO) 0.3 mg/0.3 mL AtIn; Inject 0.3 mLs (0.3 mg total) into the muscle once. for 1 dose  Dispense: 2 each; Refill: 11    History of penicillin allergy --offered and accepted testing (still need to schedule    Cutaneous latex allergy by history  Continue avoidance  Consider checking latex IgE in future    Comorbidities  Low vitamin-D --recommended restarting supplements  HTN  Breast cancer survivor    PATIENT INSTRUCTIONS AND FOLLOW-UP     Patient Instructions       Homework:  east shrimp or crawfish before next visit    Testing  Blood work for allergy test of histamine level       Check [facility] in one week for results or call [phone]       Contact me with questions or concerns       I will contact you if anything needs immediate attention.        Treatment  Continue to carry EpiPen      Follow up in about 2 weeks    No follow-ups on file.        Kamilah Sheikh MD  Allergy, Asthma & Immunology        I spent a total of 77 minutes on the day of the visit. This  includes face to face time and non-face to face time preparing to see the patient (eg, review of tests), obtaining and/or reviewing separately obtained history, documenting clinical information in the electronic or other health record, independently interpreting results and communicating results to the patient/family/caregiver, or care coordinator.

## 2025-02-05 ENCOUNTER — LAB VISIT (OUTPATIENT)
Dept: LAB | Facility: HOSPITAL | Age: 60
End: 2025-02-05
Attending: STUDENT IN AN ORGANIZED HEALTH CARE EDUCATION/TRAINING PROGRAM
Payer: COMMERCIAL

## 2025-02-05 ENCOUNTER — OFFICE VISIT (OUTPATIENT)
Dept: ALLERGY | Facility: CLINIC | Age: 60
End: 2025-02-05
Payer: COMMERCIAL

## 2025-02-05 VITALS
SYSTOLIC BLOOD PRESSURE: 107 MMHG | HEART RATE: 83 BPM | OXYGEN SATURATION: 97 % | DIASTOLIC BLOOD PRESSURE: 69 MMHG | WEIGHT: 186.94 LBS | HEIGHT: 60 IN | BODY MASS INDEX: 36.7 KG/M2

## 2025-02-05 DIAGNOSIS — T78.2XXD ANAPHYLAXIS, SUBSEQUENT ENCOUNTER: ICD-10-CM

## 2025-02-05 DIAGNOSIS — R10.9 ABDOMINAL PAIN, UNSPECIFIED ABDOMINAL LOCATION: ICD-10-CM

## 2025-02-05 DIAGNOSIS — T78.2XXD ANAPHYLAXIS, SUBSEQUENT ENCOUNTER: Primary | ICD-10-CM

## 2025-02-05 DIAGNOSIS — E55.9 VITAMIN D INSUFFICIENCY: ICD-10-CM

## 2025-02-05 DIAGNOSIS — Z88.0 HISTORY OF PENICILLIN ALLERGY: ICD-10-CM

## 2025-02-05 DIAGNOSIS — Z91.040 LATEX ALLERGY: ICD-10-CM

## 2025-02-05 DIAGNOSIS — L50.9 URTICARIA: ICD-10-CM

## 2025-02-05 DIAGNOSIS — R77.0 LOW SERUM ALBUMIN: ICD-10-CM

## 2025-02-05 PROCEDURE — 83520 IMMUNOASSAY QUANT NOS NONAB: CPT | Performed by: STUDENT IN AN ORGANIZED HEALTH CARE EDUCATION/TRAINING PROGRAM

## 2025-02-05 PROCEDURE — 99205 OFFICE O/P NEW HI 60 MIN: CPT | Mod: S$GLB,,, | Performed by: STUDENT IN AN ORGANIZED HEALTH CARE EDUCATION/TRAINING PROGRAM

## 2025-02-05 PROCEDURE — 3044F HG A1C LEVEL LT 7.0%: CPT | Mod: CPTII,S$GLB,, | Performed by: STUDENT IN AN ORGANIZED HEALTH CARE EDUCATION/TRAINING PROGRAM

## 2025-02-05 PROCEDURE — 99417 PROLNG OP E/M EACH 15 MIN: CPT | Mod: S$GLB,,, | Performed by: STUDENT IN AN ORGANIZED HEALTH CARE EDUCATION/TRAINING PROGRAM

## 2025-02-05 PROCEDURE — 1160F RVW MEDS BY RX/DR IN RCRD: CPT | Mod: CPTII,S$GLB,, | Performed by: STUDENT IN AN ORGANIZED HEALTH CARE EDUCATION/TRAINING PROGRAM

## 2025-02-05 PROCEDURE — 99999 PR PBB SHADOW E&M-EST. PATIENT-LVL V: CPT | Mod: PBBFAC,,, | Performed by: STUDENT IN AN ORGANIZED HEALTH CARE EDUCATION/TRAINING PROGRAM

## 2025-02-05 PROCEDURE — 36415 COLL VENOUS BLD VENIPUNCTURE: CPT | Performed by: STUDENT IN AN ORGANIZED HEALTH CARE EDUCATION/TRAINING PROGRAM

## 2025-02-05 PROCEDURE — 3074F SYST BP LT 130 MM HG: CPT | Mod: CPTII,S$GLB,, | Performed by: STUDENT IN AN ORGANIZED HEALTH CARE EDUCATION/TRAINING PROGRAM

## 2025-02-05 PROCEDURE — 1159F MED LIST DOCD IN RCRD: CPT | Mod: CPTII,S$GLB,, | Performed by: STUDENT IN AN ORGANIZED HEALTH CARE EDUCATION/TRAINING PROGRAM

## 2025-02-05 PROCEDURE — 3078F DIAST BP <80 MM HG: CPT | Mod: CPTII,S$GLB,, | Performed by: STUDENT IN AN ORGANIZED HEALTH CARE EDUCATION/TRAINING PROGRAM

## 2025-02-05 PROCEDURE — 3008F BODY MASS INDEX DOCD: CPT | Mod: CPTII,S$GLB,, | Performed by: STUDENT IN AN ORGANIZED HEALTH CARE EDUCATION/TRAINING PROGRAM

## 2025-02-05 RX ORDER — SEMAGLUTIDE 2.68 MG/ML
2 INJECTION, SOLUTION SUBCUTANEOUS
COMMUNITY
Start: 2024-06-28

## 2025-02-05 RX ORDER — FLUTICASONE PROPIONATE 50 MCG
1 SPRAY, SUSPENSION (ML) NASAL DAILY
COMMUNITY
Start: 2025-01-07

## 2025-02-05 RX ORDER — EPINEPHRINE 0.3 MG/.3ML
1 INJECTION SUBCUTANEOUS ONCE
Qty: 2 EACH | Refills: 11 | Status: SHIPPED | OUTPATIENT
Start: 2025-02-05 | End: 2025-02-05

## 2025-02-05 RX ORDER — TIRZEPATIDE 10 MG/.5ML
10 INJECTION, SOLUTION SUBCUTANEOUS DAILY
COMMUNITY
Start: 2024-07-29

## 2025-02-05 RX ORDER — CETIRIZINE HYDROCHLORIDE 10 MG/1
10 TABLET ORAL DAILY PRN
COMMUNITY
Start: 2025-01-07

## 2025-02-05 RX ORDER — HYDROCHLOROTHIAZIDE 12.5 MG/1
12.5 TABLET ORAL DAILY
COMMUNITY
Start: 2024-07-29

## 2025-02-05 RX ORDER — AZELASTINE 1 MG/ML
2 SPRAY, METERED NASAL 2 TIMES DAILY PRN
COMMUNITY
Start: 2025-01-07

## 2025-02-05 RX ORDER — NITROGLYCERIN 0.4 MG/1
0.4 TABLET SUBLINGUAL EVERY 5 MIN PRN
COMMUNITY

## 2025-02-05 RX ORDER — CYCLOBENZAPRINE HCL 10 MG
10 TABLET ORAL 3 TIMES DAILY PRN
COMMUNITY
Start: 2025-01-08

## 2025-02-05 RX ORDER — IPRATROPIUM BROMIDE 42 UG/1
1 SPRAY, METERED NASAL 2 TIMES DAILY PRN
COMMUNITY

## 2025-02-05 RX ORDER — KETOROLAC TROMETHAMINE 10 MG/1
10 TABLET, FILM COATED ORAL EVERY 6 HOURS PRN
COMMUNITY
Start: 2024-07-29

## 2025-02-05 NOTE — PATIENT INSTRUCTIONS
Homework:  east shrimp or crawfish before next visit    Testing  Blood work for allergy test of histamine level       [facility] for results or call [phone]       Contact me with questions or concerns       I will contact you if anything needs immediate attention.        Treatment  Continue to carry EpiPen      Follow up in about 2 weeks

## 2025-02-07 LAB — TRYPTASE LEVEL: 3.8 NG/ML

## 2025-02-10 ENCOUNTER — TELEPHONE (OUTPATIENT)
Dept: ALLERGY | Facility: CLINIC | Age: 60
End: 2025-02-10
Payer: COMMERCIAL

## 2025-02-10 NOTE — TELEPHONE ENCOUNTER
----- Message from Allie sent at 2/10/2025  4:30 PM CST -----  Contact: [phone] Patient  Pharmacy Change for future refills:    Requesting an RX refill or new RX.    Is this a refill or new RX: new    RX name and strength (copy/paste from chart):  EPINEPHrine (EPIPEN) 0.3 mg/0.3 mL AtIn 2 each 1 1/11/2025 1/11/2026 No    Is this a 30 day or 90 day RX:     Pharmacy name and phone # (copy/paste from chart):    [facility] [place] - [address]  [address]  [place] [address]  Phone: [phone] Fax: [phone]      The doctors have asked that we provide their patients with the following 2 reminders -- prescription refills can take up to 72 hours, and a friendly reminder that in the future you can use your [facility] account to request refills: yes    Comments: Pt would like a call back once completed.

## 2025-05-28 ENCOUNTER — HOSPITAL ENCOUNTER (OUTPATIENT)
Dept: RADIOLOGY | Facility: HOSPITAL | Age: 60
Discharge: HOME | End: 2025-05-28
Payer: COMMERCIAL

## 2025-05-28 DIAGNOSIS — Z98.890 OTHER SPECIFIED POSTPROCEDURAL STATES: ICD-10-CM

## 2025-05-28 DIAGNOSIS — Z90.13 ACQUIRED ABSENCE OF BILATERAL BREASTS AND NIPPLES: ICD-10-CM

## 2025-05-28 DIAGNOSIS — N64.4 MASTODYNIA: ICD-10-CM

## 2025-05-28 PROCEDURE — 76642 ULTRASOUND BREAST LIMITED: CPT | Mod: TC,50

## 2025-05-28 PROCEDURE — 76642 ULTRASOUND BREAST LIMITED: CPT | Mod: 26,,, | Performed by: RADIOLOGY

## 2025-06-02 ENCOUNTER — TELEPHONE (OUTPATIENT)
Dept: SURGERY | Facility: CLINIC | Age: 60
End: 2025-06-02
Payer: COMMERCIAL

## 2025-06-03 ENCOUNTER — OFFICE VISIT (OUTPATIENT)
Dept: PRIMARY CARE CLINIC | Facility: CLINIC | Age: 60
End: 2025-06-03
Payer: COMMERCIAL

## 2025-06-03 ENCOUNTER — TELEPHONE (OUTPATIENT)
Dept: SURGERY | Facility: CLINIC | Age: 60
End: 2025-06-03
Payer: COMMERCIAL

## 2025-06-03 VITALS
BODY MASS INDEX: 36.66 KG/M2 | HEIGHT: 60 IN | TEMPERATURE: 98 F | DIASTOLIC BLOOD PRESSURE: 86 MMHG | WEIGHT: 186.75 LBS | SYSTOLIC BLOOD PRESSURE: 120 MMHG | HEART RATE: 83 BPM | OXYGEN SATURATION: 98 % | RESPIRATION RATE: 16 BRPM

## 2025-06-03 DIAGNOSIS — N61.0 MASTITIS IN FEMALE: ICD-10-CM

## 2025-06-03 DIAGNOSIS — I10 HYPERTENSION, UNSPECIFIED TYPE: ICD-10-CM

## 2025-06-03 DIAGNOSIS — E11.65 TYPE 2 DIABETES MELLITUS WITH HYPERGLYCEMIA, WITHOUT LONG-TERM CURRENT USE OF INSULIN: ICD-10-CM

## 2025-06-03 DIAGNOSIS — T78.40XA ALLERGIC REACTION, INITIAL ENCOUNTER: ICD-10-CM

## 2025-06-03 DIAGNOSIS — T78.40XA ALLERGIC REACTION: ICD-10-CM

## 2025-06-03 DIAGNOSIS — Z90.13 HX OF MASTECTOMY, BILATERAL: ICD-10-CM

## 2025-06-03 DIAGNOSIS — E55.9 VITAMIN D DEFICIENCY: Chronic | ICD-10-CM

## 2025-06-03 DIAGNOSIS — R73.03 PREDIABETES: ICD-10-CM

## 2025-06-03 DIAGNOSIS — Z79.899 OTHER LONG TERM (CURRENT) DRUG THERAPY: ICD-10-CM

## 2025-06-03 DIAGNOSIS — R35.1 NOCTURIA: ICD-10-CM

## 2025-06-03 DIAGNOSIS — Z00.00 ANNUAL PHYSICAL EXAM: Primary | ICD-10-CM

## 2025-06-03 DIAGNOSIS — G47.33 OSA ON CPAP: ICD-10-CM

## 2025-06-03 PROBLEM — J10.1 INFLUENZA DUE TO INFLUENZA A VIRUS: Status: RESOLVED | Noted: 2020-02-07 | Resolved: 2025-06-03

## 2025-06-03 PROCEDURE — 3008F BODY MASS INDEX DOCD: CPT | Mod: CPTII,S$GLB,, | Performed by: STUDENT IN AN ORGANIZED HEALTH CARE EDUCATION/TRAINING PROGRAM

## 2025-06-03 PROCEDURE — 3044F HG A1C LEVEL LT 7.0%: CPT | Mod: CPTII,S$GLB,, | Performed by: STUDENT IN AN ORGANIZED HEALTH CARE EDUCATION/TRAINING PROGRAM

## 2025-06-03 PROCEDURE — 3079F DIAST BP 80-89 MM HG: CPT | Mod: CPTII,S$GLB,, | Performed by: STUDENT IN AN ORGANIZED HEALTH CARE EDUCATION/TRAINING PROGRAM

## 2025-06-03 PROCEDURE — 99999 PR PBB SHADOW E&M-EST. PATIENT-LVL V: CPT | Mod: PBBFAC,,, | Performed by: STUDENT IN AN ORGANIZED HEALTH CARE EDUCATION/TRAINING PROGRAM

## 2025-06-03 PROCEDURE — 99214 OFFICE O/P EST MOD 30 MIN: CPT | Mod: S$GLB,,, | Performed by: STUDENT IN AN ORGANIZED HEALTH CARE EDUCATION/TRAINING PROGRAM

## 2025-06-03 PROCEDURE — 1159F MED LIST DOCD IN RCRD: CPT | Mod: CPTII,S$GLB,, | Performed by: STUDENT IN AN ORGANIZED HEALTH CARE EDUCATION/TRAINING PROGRAM

## 2025-06-03 PROCEDURE — 3074F SYST BP LT 130 MM HG: CPT | Mod: CPTII,S$GLB,, | Performed by: STUDENT IN AN ORGANIZED HEALTH CARE EDUCATION/TRAINING PROGRAM

## 2025-06-03 PROCEDURE — 1160F RVW MEDS BY RX/DR IN RCRD: CPT | Mod: CPTII,S$GLB,, | Performed by: STUDENT IN AN ORGANIZED HEALTH CARE EDUCATION/TRAINING PROGRAM

## 2025-06-03 RX ORDER — AMLODIPINE BESYLATE 10 MG/1
10 TABLET ORAL DAILY
Qty: 20 TABLET | Refills: 0 | Status: SHIPPED | OUTPATIENT
Start: 2025-06-16

## 2025-06-03 RX ORDER — FLUTICASONE PROPIONATE 50 MCG
1 SPRAY, SUSPENSION (ML) NASAL DAILY
Qty: 48 G | Refills: 3 | Status: CANCELLED | OUTPATIENT
Start: 2025-06-03

## 2025-06-03 RX ORDER — TIRZEPATIDE 12.5 MG/.5ML
INJECTION, SOLUTION SUBCUTANEOUS
COMMUNITY
End: 2025-06-03 | Stop reason: SDUPTHER

## 2025-06-03 RX ORDER — AZELASTINE 1 MG/ML
2 SPRAY, METERED NASAL 2 TIMES DAILY PRN
Qty: 90 ML | Refills: 3 | Status: CANCELLED | OUTPATIENT
Start: 2025-06-03

## 2025-06-03 RX ORDER — HYDROCHLOROTHIAZIDE 12.5 MG/1
12.5 TABLET ORAL DAILY
Qty: 90 TABLET | Refills: 3 | Status: SHIPPED | OUTPATIENT
Start: 2025-06-23

## 2025-06-03 RX ORDER — TIRZEPATIDE 12.5 MG/.5ML
12.5 INJECTION, SOLUTION SUBCUTANEOUS WEEKLY
Qty: 2 ML | Refills: 5 | Status: SHIPPED | OUTPATIENT
Start: 2025-06-03

## 2025-06-03 RX ORDER — ERGOCALCIFEROL 1.25 MG/1
50000 CAPSULE ORAL
Qty: 12 CAPSULE | Refills: 3 | Status: CANCELLED | OUTPATIENT
Start: 2025-06-03

## 2025-06-03 RX ORDER — EPINEPHRINE 0.3 MG/.3ML
2 INJECTION SUBCUTANEOUS
Qty: 2 EACH | Refills: 1 | Status: SHIPPED | OUTPATIENT
Start: 2025-06-03 | End: 2026-06-03

## 2025-06-03 RX ORDER — EPINEPHRINE 0.3 MG/.3ML
2 INJECTION SUBCUTANEOUS
Qty: 2 EACH | Refills: 1 | Status: CANCELLED | OUTPATIENT
Start: 2025-06-03 | End: 2026-06-03

## 2025-06-03 RX ORDER — ERGOCALCIFEROL 1.25 MG/1
CAPSULE ORAL
Qty: 31 CAPSULE | Refills: 0 | Status: SHIPPED | OUTPATIENT
Start: 2025-06-03 | End: 2026-05-29

## 2025-06-04 ENCOUNTER — PATIENT MESSAGE (OUTPATIENT)
Dept: HEMATOLOGY/ONCOLOGY | Facility: CLINIC | Age: 60
End: 2025-06-04
Payer: COMMERCIAL

## 2025-06-12 ENCOUNTER — OFFICE VISIT (OUTPATIENT)
Dept: SLEEP MEDICINE | Facility: CLINIC | Age: 60
End: 2025-06-12
Attending: PSYCHIATRY & NEUROLOGY
Payer: COMMERCIAL

## 2025-06-12 VITALS
OXYGEN SATURATION: 97 % | HEART RATE: 75 BPM | SYSTOLIC BLOOD PRESSURE: 114 MMHG | DIASTOLIC BLOOD PRESSURE: 55 MMHG | WEIGHT: 185.19 LBS | BODY MASS INDEX: 36.17 KG/M2

## 2025-06-12 DIAGNOSIS — I10 HYPERTENSION, UNSPECIFIED TYPE: ICD-10-CM

## 2025-06-12 DIAGNOSIS — G47.33 OSA ON CPAP: ICD-10-CM

## 2025-06-12 DIAGNOSIS — G47.30 SLEEP APNEA, UNSPECIFIED TYPE: Primary | ICD-10-CM

## 2025-06-12 PROCEDURE — 99999 PR PBB SHADOW E&M-EST. PATIENT-LVL IV: CPT | Mod: PBBFAC,,,

## 2025-06-12 NOTE — PATIENT INSTRUCTIONS
SLEEP LAB ([facility] or [facility]) will contact you to schedule the sleep study. Their number is [phone] (ext [phone]). Please call them if you do not hear from them in 2 weeks from now.  The [facility] is located on 7th floor of the [facility]; [facility] is located in [facility] ( 3rd floor [facility]).    SLEEP CLINIC (my assistant) will call you when the sleep study results are ready or I will message you through the portal with the results as we have discussed - if you have not heard from us by 2 weeks from the date of the study, or you can use [facility] to contact me.    Our clinic phone number is [phone] (ext 1)       You are advised to abstain from driving should you feel sleepy or drowsy.

## 2025-06-12 NOTE — PROGRESS NOTES
CC: No chief complaint on file.     Referred by Yousif Moreno MD for a sleep evaluation.     HPI     Subjective    HPI:  Sleep Apnea:  Symptoms:    [x] Loud snoring[] Witnessed apneas [] Gasping [] Choking[x] Interrupted Sleep [x]  Nocturia [x]  Non refreshing sleep [x] Excessive daytime sleepiness   []  Morning headaches [x] Nasal Congestion [] Dry Mouth [] Excessive Daytime Fatigue [] None  Duration:   [] Days  [] Months  [x] Years  Comments: patient here to resume cpap therapy, previously using remed machine in 2018, got covid in 2020 stopped using due to lack of supplies. Reports continued snoring interrupted sleep, nocturia, non refreshing sleep, excessive daytime sleepiness, and chronic nasal congestion for years.     Wake up Feeling: [] Refreshed  [x] Non refreshed [] Occasionally Tired  Do You Take Naps: [] Yes [x] No Number of Naps:          6/5/2025    11:06 AM 6/3/2025    10:02 AM   EPWORTH SLEEPINESS SCALE TOTAL SCORE    Total score 15  19       Patient-reported     (Validated Sleepiness Questionnaire with higher score indicating greater sleepiness (0-24)       (Validated [place] Questionnaire with higher score indicating greater risk of NITA (0-2, 3-4, 5-8)) Risk: High        6/5/2025    11:10 AM   Sleep Clinic New Patient   What time do you go to bed on a week day? (Give a range) 9:00   What time do you go to bed on a day off? (Give a range) 11:00   How long does it take you to fall asleep? (Give a range) 45minutes   On average, how many times per night do you wake up? 4-5   How long does it take you to fall back into sleep? (Give a range) Not long   What time do you wake up to start your day on a week day? (Give a range) 6:00   What time do you wake up to start your day on a day off? (Give a range) 6:00   What time do you get out of bed? (Give a range) 6:30   On average, how many hours do you sleep? 4-5   On average, how many naps do you take per day? 2   Rate your sleep quality from 0 to 5  (0-poor, 5-great). 2   Have you experienced:  Weight gain?   How much weight have you lost or gained (in lbs.) in the last year? Lost 10   On average, how many times per night do you go to the bathroom?  4-5   Have you ever had a sleep study/CPAP machine/surgery for sleep apnea? Yes   Have you ever had a CPAP machine for sleep apnea? Yes   Have you ever had surgery for sleep apnea? No       Current Sleep Medication(s): None     Previous Sleep Medication(s): None     Sleep Factors:  Sleep Environment: Cool, Dark, and Comfortable  Caffeine: 1 beverages, last beverage at   Bed Partner: None  Alcohol: none  Sleep Disorder Family History: None          Objective    Records Reviewed:   Lab Results   Component Value Date    TSH 3.895 01/18/2025    CO2 29 01/18/2025    HGBA1C 5.8 (H) 01/18/2025    FERRITIN 84.6 08/09/2007      No echocardiogram results found for the past 12 months  Sleep Studies : None    Objective:  Vitals: Blood pressure (!) 114/55, pulse 75, weight 84 kg (185 lb 3.2 oz), SpO2 97%.   Exam:  Gen: Well Appearing, demonstrates insight  Skin: No rash or lesions on bridge of nose or mouth          Assessment & Plan  NITA on CPAP  Needs requalification study to order new cpap, HST  Continue taking medications mounjaro 12.5 mg lost 15 lbs approximately  Orders:    Ambulatory referral/consult to Sleep Disorders    Sleep apnea, unspecified type  Diagnostic Testing: Home Sleep Apnea Test (HST) is indicated due to comorbid conditions: Hypertension, Obesity, and Diabetes with symptoms: snoring, interrupted sleep, nocturia, restless sleep, non refreshing sleep, and excessive daytime sleepiness, ESS: 15/24  Education & Treatment Options:  Discussed the etiology and consequences of untreated NITA, including risks of cardiovascular disease, hypertension, stroke, metabolic dysfunction, and excessive daytime sleepiness.  Reviewed treatment options:  Positive Airway Pressure (PAP) therapy, Weight loss Positional therapy, Oral  appliance therapy  Lifestyle Modifications: Advised weight management, alcohol reduction, and optimizing sleep hygiene.  Safety Precautions: Recommended avoiding driving if experiencing excessive daytime sleepiness.  Next Steps:  If NITA is confirmed, patient agrees to trial APAP  If PAP therapy is initiated, follow-up within 31-90 days to assess compliance and effectiveness.  Results will be communicated by Mychart   Orders:    Home Sleep Study; Future    Hypertension, unspecified type  Follow up with pcp for continued management  Continue taking medications.

## 2025-07-01 ENCOUNTER — OFFICE VISIT (OUTPATIENT)
Dept: SURGERY | Facility: CLINIC | Age: 60
End: 2025-07-01
Payer: COMMERCIAL

## 2025-07-01 VITALS
WEIGHT: 185.19 LBS | HEIGHT: 60 IN | HEART RATE: 70 BPM | SYSTOLIC BLOOD PRESSURE: 117 MMHG | DIASTOLIC BLOOD PRESSURE: 78 MMHG | OXYGEN SATURATION: 98 % | BODY MASS INDEX: 36.36 KG/M2

## 2025-07-01 DIAGNOSIS — Z12.31 SCREENING MAMMOGRAM, ENCOUNTER FOR: ICD-10-CM

## 2025-07-01 DIAGNOSIS — Z90.13 HX OF MASTECTOMY, BILATERAL: Primary | ICD-10-CM

## 2025-07-01 DIAGNOSIS — N61.0 MASTITIS IN FEMALE: ICD-10-CM

## 2025-07-01 PROCEDURE — 3078F DIAST BP <80 MM HG: CPT | Mod: CPTII,S$GLB,, | Performed by: PHYSICIAN ASSISTANT

## 2025-07-01 PROCEDURE — 3074F SYST BP LT 130 MM HG: CPT | Mod: CPTII,S$GLB,, | Performed by: PHYSICIAN ASSISTANT

## 2025-07-01 PROCEDURE — 99999 PR PBB SHADOW E&M-EST. PATIENT-LVL IV: CPT | Mod: PBBFAC,,, | Performed by: PHYSICIAN ASSISTANT

## 2025-07-01 PROCEDURE — 99203 OFFICE O/P NEW LOW 30 MIN: CPT | Mod: S$GLB,,, | Performed by: PHYSICIAN ASSISTANT

## 2025-07-01 PROCEDURE — 3044F HG A1C LEVEL LT 7.0%: CPT | Mod: CPTII,S$GLB,, | Performed by: PHYSICIAN ASSISTANT

## 2025-07-01 PROCEDURE — 1159F MED LIST DOCD IN RCRD: CPT | Mod: CPTII,S$GLB,, | Performed by: PHYSICIAN ASSISTANT

## 2025-07-01 PROCEDURE — 3008F BODY MASS INDEX DOCD: CPT | Mod: CPTII,S$GLB,, | Performed by: PHYSICIAN ASSISTANT

## 2025-07-01 NOTE — PROGRESS NOTES
[facility]  Department of Surgery    REFERRING:  Yousif Moreno MD  [address]  [address]  [place] [address]  Yousif Moreno MD  CHIEF COMPLAINT: left breast pain    Subjective:      Paty Gaona is a 59 y.o. postmenopausal female referred for evaluation of breast pain. Change was noted a few months ago.  Patient does routinely do self breast exams.  Patient has noted a change on breast exam.  Patient denies nipple discharge. Describes the pain as sharp and worse when laying on her right side. Patient has a personal history of right breast cancer s/p right mastectomy in 1995. She was then found to have atypia in the left breast with a subsequent biopsy several years later (2006) so proceeded with a left mastectomy with bilateral autologous reconstruction.     FAMILY history:  Mother: breast cancer, 40's - stage 4, passed at 91    Past Medical History:   Diagnosis Date    Breast cancer     Breast bilateral. Had double masectomy    Hypertension      Past Surgical History:   Procedure Laterality Date    BILATERAL OOPHORECTOMY      BREAST RECONSTRUCTION Bilateral     COLONOSCOPY  2006    COLONOSCOPY N/A 03/15/2018    Procedure: COLONOSCOPY;  Surgeon: Ruben Hernadez MD;  Location: [facility] ENDO;  Service: General;  Laterality: N/A;    COLONOSCOPY N/A 10/06/2022    Procedure: COLONOSCOPY;  Surgeon: Juan Pablo Hinson MD;  Location: [facility];  Service: Endoscopy;  Laterality: N/A;  with polypectomy    COLONOSCOPY W/ BIOPSIES AND POLYPECTOMY  03/15/2018    HYSTERECTOMY N/A     KNEE ARTHROSCOPY Right 1993    MASTECTOMY Right 1995    right first, later had left done    MASTECTOMY Bilateral 2006    recurrence of breast cancer, then had reconstruction     Medications Ordered Prior to Encounter[1]  Social History[2]  Family History   Problem Relation Name Age of Onset    Breast cancer Mother  48    Heart attack Mother      Coronary artery disease Mother      Peripheral vascular  disease Father      Heart attack Father      Heart attack Brother x3     Stroke Brother          in 2020    Ovarian cancer Neg Hx      Colon cancer Neg Hx      Lung cancer Neg Hx      Thyroid cancer Neg Hx      Pancreatitis Neg Hx         Review of Systems  Review of Systems   Constitutional:  Negative for chills, fever and malaise/fatigue.   HENT:  Negative for congestion.    Eyes:  Negative for discharge.   Respiratory:  Negative for cough, shortness of breath and stridor.    Cardiovascular:  Negative for chest pain and palpitations.   Gastrointestinal:  Negative for abdominal pain and nausea.   Neurological:  Negative for headaches.   Psychiatric/Behavioral:  The patient is not nervous/anxious.           Objective:        /78 (BP Location: Right arm, Patient Position: Sitting)   Pulse 70   Ht 5' (1.524 m)   Wt 84 kg (185 lb 3 oz)   SpO2 98%   BMI 36.17 kg/m²   Physical Exam   Vitals reviewed.  Constitutional: She is oriented to person, place, and time.   HENT:   Head: Normocephalic and atraumatic.   Nose: Nose normal.   Eyes: Pupils are equal, round, and reactive to light. Right eye exhibits no discharge. Left eye exhibits no discharge.   Pulmonary/Chest: Effort normal and breath sounds normal. No stridor. No respiratory distress. She exhibits no mass, no tenderness and no edema. Right breast exhibits no inverted nipple, no mass, no nipple discharge, no skin change and no tenderness. Left breast exhibits no inverted nipple, no mass, no nipple discharge, no skin change and no tenderness. No breast swelling or bleeding. Breasts are symmetrical.   Abdominal: Normal appearance.   Genitourinary: No breast swelling or bleeding.   Neurological: She is alert and oriented to person, place, and time.   Skin: Skin is warm and dry.     Psychiatric: Her behavior is normal. Mood, judgment and thought content normal.         Radiology review: Images personally reviewed by me in the clinic.    5/28/25: US Breast  Bilateral:    Findings:  Normal tissue corresponds to the areas of concern in both reconstructed breasts.  No suspicious finding or acute abnormality.     Impression:  Bilateral  There is no sonographic evidence of malignancy.     She has paper orders and was unable to get a mammogram today.  Given this is new intermittent pain in her reconstructed breasts and she has not seen a breast or plastic surgeon in many years, recommend additional evaluation in breast surgery clinic.  Consider bilateral diagnostic mammogram, as clinically indicated.     BI-RADS Category:   Overall: 2 - Benign        Recommendation:  Surgical Consult is recommended for both breasts.     The findings and recommendations were discussed directly with her by Dr. MECHELLE Barrientos at the time of interpretation.      Assessment:      Paty Gaona is a 59 y.o. postmenopausal female with history of right breast cancer s/p bilateral mastectomy with autologous reconstruction.      Plan:   We discussed the options for management of breast pain.    Given history, I do recommend proceeding with bilateral mammogram to evaluate new breast sxs. No exam correlate concerning for recurrence. Patient reassured.     Will be in touch with imaging results.     The patient is in agreement with the plan. Questions were encouraged and answered to patient's satisfaction.NAME@ will call our office with any questions or concerns.             [1]   Current Outpatient Medications on File Prior to Visit   Medication Sig Dispense Refill    amLODIPine (NORVASC) 10 MG tablet Take 1 tablet (10 mg total) by mouth once daily. 20 tablet 0    azelastine (ASTELIN) 137 mcg (0.1 %) nasal spray 2 sprays by Nasal route 2 (two) times daily as needed.      cetirizine (ZYRTEC) 10 MG tablet Take 10 mg by mouth daily as needed.      cyclobenzaprine (FLEXERIL) 10 MG tablet Take 10 mg by mouth 3 (three) times daily as needed.      EPINEPHrine (EPIPEN) 0.3 mg/0.3 mL AtIn Inject 0.6 mLs (0.6 mg  total) into the muscle as needed (allergic reaction/anaphylaxis). 2 each 1    ergocalciferol (VITAMIN D2) 50,000 unit Cap Take 1 capsule (50,000 Units total) by mouth every 7 days for 180 days, THEN 1 capsule (50,000 Units total) every 30 days. 31 capsule 0    fluticasone propionate (FLONASE) 50 mcg/actuation nasal spray 1 spray by Each Nostril route once daily.      hydroCHLOROthiazide 12.5 MG Tab Take 1 tablet (12.5 mg total) by mouth once daily. 90 tablet 3    ibuprofen (ADVIL,MOTRIN) 800 MG tablet Take 1 tablet (800 mg total) by mouth 3 (three) times daily. 90 tablet 1    tirzepatide (MOUNJARO) 12.5 mg/0.5 mL PnIj Inject 12.5 mg into the skin once a week. 2 mL 5     Current Facility-Administered Medications on File Prior to Visit   Medication Dose Route Frequency Provider Last Rate Last Admin    0.9%  NaCl infusion   Intravenous Continuous Juan Pablo Hinson MD        LIDOcaine (PF) 10 mg/ml (1%) injection 10 mg  1 mL Intradermal Once PRN Juan Pablo Hinson MD       [2]   Social History  Socioeconomic History    Marital status:    Occupational History    Occupation:    Tobacco Use    Smoking status: Never    Smokeless tobacco: Never   Vaping Use    Vaping status: Never Used   Substance and Sexual Activity    Alcohol use: No    Drug use: No    Sexual activity: Not Currently     Partners: Male     Birth control/protection: None     Social Drivers of Health     Financial Resource Strain: Medium Risk (7/26/2024)    Overall Financial Resource Strain (CARDIA)     Difficulty of Paying Living Expenses: Somewhat hard   Food Insecurity: Food Insecurity Present (7/26/2024)    Hunger Vital Sign     Worried About Running Out of Food in the Last Year: Sometimes true     Ran Out of Food in the Last Year: Sometimes true   Physical Activity: Unknown (7/26/2024)    Exercise Vital Sign     Days of Exercise per Week: 0 days   Stress: No Stress Concern Present (7/26/2024)    [employer] - Occupational Stress Questionnaire     Feeling of Stress : Not at all   Housing Stability: High Risk (7/26/2024)    Housing Stability Vital Sign     Unable to Pay for Housing in the Last Year: Yes

## 2025-07-03 ENCOUNTER — TELEPHONE (OUTPATIENT)
Dept: RADIOLOGY | Facility: HOSPITAL | Age: 60
End: 2025-07-03
Payer: COMMERCIAL

## 2025-07-03 NOTE — TELEPHONE ENCOUNTER
----- Message from Karen Castellanos PA-C sent at 7/3/2025  1:11 PM CDT -----  Hi! Patient needs bilateral diagnostic mammogram pls :)

## 2025-07-07 ENCOUNTER — HOSPITAL ENCOUNTER (OUTPATIENT)
Dept: SLEEP MEDICINE | Facility: OTHER | Age: 60
Discharge: HOME | End: 2025-07-07
Payer: COMMERCIAL

## 2025-07-07 DIAGNOSIS — G47.33 OSA (OBSTRUCTIVE SLEEP APNEA): Primary | ICD-10-CM

## 2025-07-07 DIAGNOSIS — G47.30 SLEEP APNEA, UNSPECIFIED TYPE: ICD-10-CM

## 2025-07-07 PROCEDURE — 95800 SLP STDY UNATTENDED: CPT

## 2025-07-08 PROBLEM — G47.30 SLEEP APNEA: Status: ACTIVE | Noted: 2025-07-08

## 2025-07-09 ENCOUNTER — HOSPITAL ENCOUNTER (OUTPATIENT)
Dept: RADIOLOGY | Facility: HOSPITAL | Age: 60
Discharge: HOME | End: 2025-07-09
Attending: PHYSICIAN ASSISTANT
Payer: COMMERCIAL

## 2025-07-09 DIAGNOSIS — N61.0 MASTITIS IN FEMALE: ICD-10-CM

## 2025-07-09 DIAGNOSIS — Z90.13 HX OF MASTECTOMY, BILATERAL: ICD-10-CM

## 2025-07-09 DIAGNOSIS — Z12.31 SCREENING MAMMOGRAM, ENCOUNTER FOR: ICD-10-CM

## 2025-07-09 PROBLEM — G47.33 OSA (OBSTRUCTIVE SLEEP APNEA): Status: ACTIVE | Noted: 2025-07-08

## 2025-07-09 PROCEDURE — 77062 BREAST TOMOSYNTHESIS BI: CPT | Mod: 26,,, | Performed by: RADIOLOGY

## 2025-07-09 PROCEDURE — 77066 DX MAMMO INCL CAD BI: CPT | Mod: 26,,, | Performed by: RADIOLOGY

## 2025-07-09 PROCEDURE — 95800 SLP STDY UNATTENDED: CPT | Mod: 26,,, | Performed by: PSYCHIATRY & NEUROLOGY

## 2025-07-09 PROCEDURE — 77066 DX MAMMO INCL CAD BI: CPT | Mod: TC

## 2025-07-09 NOTE — PROCEDURES
Patient Name Paty Gaona Study Ordered By Gideon Oconnell  Date of Night 1 07/07/2025 09:18:20 PM Date of Birth 1965  Identification Number [id number]  Overall AHI (4%)* % time < 90% Sp02 Mean Sp02 % time snoring > 30dB  40 6% 94.8% 26.3%  PHYSICIAN INTERPRETATION AND COMMENTS: Findings are consistent with severe, obstructive sleep apnea (NITA) (G47.33),  by overall AHI (apnea hypopnea index). This study was technically adequate to allow for interpretation.  CLINICAL HISTORY: 59 year old female presented with: 14 inch neck, BMI of 35, an [place] sleepiness score of 21, history of  hypertension, diabetes, a previous diagnosis of NITA and symptoms of nocturnal snoring and witnessed apneas. Based on  the clinical history, the patient has a high pre-test probability of having Moderate NITA.  SLEEP STUDY FINDINGS: Patient underwent a 1 night Home Sleep Test and by behavioral criteria, slept for approximately  6.43 hours, with a sleep latency of 6 minutes and a sleep efficiency of 94%. Severe sleep disordered breathing (AHI=40) is  noted based on a 4% hypopnea desaturation criteria. The patient slept supine 66.8% of the night based on valid recording  time of 6.17 hours and is 1.8 times as likely to have apneas/hypopneas when supine. The apneas/hypopneas are  accompanied by mild oxygen desaturation (percent time below 90% SpO2: 6%, Min SpO2: 68.7%). The average desaturation  across all sleep disordered breathing events is 5.9%. Snoring occurs for 26.3% (30 dB) of the study, 3.2% is very loud. The  mean pulse rate is 77.4 BPM, with frequent pulse rate variability (52 events with >= 6 BPM increase/decrease per hour). RDI  was 53/hr  TREATMENT CONSIDERATIONS: Consider trial of Auto-titrating CPAP 6-20 cm, mask of patient's choice, and heated  humidification. If patient has difficulty with CPAP adherence or ongoing NITA symptoms or despite CPAP adherence, then  consider an in-lab titration sleep study in order to  determine optimal fixed CPAP setting. Alternatively consider oral  appliance fitted by a dentist specializing in these devices, or surgical consultation for uvulopalatopharyngoplasty (UPPP)  for treatment of obstructive sleep apnea. Consider nasal continuous positive airway pressure (CPAP) as the initial  treatment choice for severe obstructive sleep apnea. Consider an attended in-lab CPAP titration study, given the possibility  of co-morbid sleep related hypoventilation. If CPAP trial is unsuccessful, refer to Sleep Clinic for further evaluation and  management.  DISEASE MANAGEMENT CONSIDERATIONS: Definitive treatment for NITA is recommended. Consider Sleep Clinic referral for  NITA management.  Signature: Date: 07- 17:37:41 EST  Study Review: The raw data of this SEJAL study has been reviewed, with the report confirmed and electronically signed by Bee Licona, Read  and interpreted by Bee Licona MD, Diplomate, Sleep Medicine, ABPN.. Caution: The diagnosis of the Obstructive Sleep Apnea Syndrome must  be based on all available clinical data, of which this study is only a part. Thus final diagnosis and treatment recommendations should include

## 2025-07-10 ENCOUNTER — RESULTS FOLLOW-UP (OUTPATIENT)
Dept: SLEEP MEDICINE | Facility: CLINIC | Age: 60
End: 2025-07-10
Payer: COMMERCIAL

## 2025-07-10 DIAGNOSIS — G47.33 OBSTRUCTIVE SLEEP APNEA: Primary | ICD-10-CM
